# Patient Record
Sex: FEMALE | Race: WHITE | NOT HISPANIC OR LATINO | Employment: FULL TIME | URBAN - NONMETROPOLITAN AREA
[De-identification: names, ages, dates, MRNs, and addresses within clinical notes are randomized per-mention and may not be internally consistent; named-entity substitution may affect disease eponyms.]

---

## 2023-03-14 PROBLEM — K21.9 GERD (GASTROESOPHAGEAL REFLUX DISEASE): Status: ACTIVE | Noted: 2023-03-14

## 2023-03-14 PROBLEM — R19.7 DIARRHEA: Status: ACTIVE | Noted: 2023-03-14

## 2023-03-14 PROBLEM — J45.909 REACTIVE AIRWAY DISEASE (HHS-HCC): Status: ACTIVE | Noted: 2023-03-14

## 2023-03-14 PROBLEM — F32.2 SEVERE MAJOR DEPRESSION (MULTI): Status: ACTIVE | Noted: 2023-03-14

## 2023-03-14 PROBLEM — K63.5 COLON POLYP: Status: ACTIVE | Noted: 2023-03-14

## 2023-03-14 RX ORDER — CHOLECALCIFEROL (VITAMIN D3) 125 MCG
1 CAPSULE ORAL DAILY
COMMUNITY
Start: 2022-10-07 | End: 2024-05-29 | Stop reason: ALTCHOICE

## 2023-03-14 RX ORDER — TRAZODONE HYDROCHLORIDE 50 MG/1
50 TABLET ORAL NIGHTLY
COMMUNITY
End: 2024-05-29 | Stop reason: ALTCHOICE

## 2023-03-14 RX ORDER — CALCIUM CARBONATE 600 MG
600 TABLET ORAL
COMMUNITY
End: 2023-10-04 | Stop reason: SDUPTHER

## 2023-03-14 RX ORDER — LYSINE HCL 500 MG
1 TABLET ORAL 2 TIMES DAILY
COMMUNITY
Start: 2022-10-27 | End: 2024-05-29 | Stop reason: SDUPTHER

## 2023-03-14 RX ORDER — OMEPRAZOLE 40 MG/1
40 CAPSULE, DELAYED RELEASE ORAL
COMMUNITY
Start: 2022-10-07 | End: 2024-05-29 | Stop reason: ALTCHOICE

## 2023-03-14 RX ORDER — ALBUTEROL SULFATE 90 UG/1
2 AEROSOL, METERED RESPIRATORY (INHALATION) EVERY 4 HOURS PRN
COMMUNITY
End: 2024-05-29 | Stop reason: ALTCHOICE

## 2023-03-14 RX ORDER — DICYCLOMINE HYDROCHLORIDE 10 MG/1
10 CAPSULE ORAL 4 TIMES DAILY
COMMUNITY
End: 2024-05-29 | Stop reason: ALTCHOICE

## 2023-03-21 PROBLEM — R56.9 UNSPECIFIED CONVULSIONS (MULTI): Status: ACTIVE | Noted: 2023-03-21

## 2023-03-21 PROBLEM — R63.5 WEIGHT GAIN: Status: ACTIVE | Noted: 2023-03-21

## 2023-03-21 PROBLEM — U07.1 COVID-19: Status: ACTIVE | Noted: 2023-03-21

## 2023-03-21 PROBLEM — Z86.19 PERSONAL HISTORY OF OTHER INFECTIOUS AND PARASITIC DISEASES: Status: ACTIVE | Noted: 2023-03-21

## 2023-03-21 PROBLEM — E55.9 VITAMIN D DEFICIENCY: Status: ACTIVE | Noted: 2023-03-21

## 2023-03-21 PROBLEM — Z87.898 PERSONAL HISTORY OF OTHER SPECIFIED CONDITIONS: Status: ACTIVE | Noted: 2023-03-21

## 2023-03-21 PROBLEM — Z87.09 PERSONAL HISTORY OF OTHER DISEASES OF THE RESPIRATORY SYSTEM: Status: ACTIVE | Noted: 2023-03-21

## 2023-03-21 PROBLEM — Z28.21 IMMUNIZATION NOT CARRIED OUT BECAUSE OF PATIENT REFUSAL: Status: ACTIVE | Noted: 2023-03-21

## 2023-03-22 ENCOUNTER — APPOINTMENT (OUTPATIENT)
Dept: PRIMARY CARE | Facility: CLINIC | Age: 23
End: 2023-03-22
Payer: COMMERCIAL

## 2023-03-27 RX ORDER — MULTIVITAMIN
1 TABLET ORAL 2 TIMES DAILY
COMMUNITY
Start: 2022-10-29 | End: 2023-03-29 | Stop reason: SDUPTHER

## 2023-03-29 ENCOUNTER — OFFICE VISIT (OUTPATIENT)
Dept: PRIMARY CARE | Facility: CLINIC | Age: 23
End: 2023-03-29
Payer: COMMERCIAL

## 2023-03-29 VITALS
HEIGHT: 66 IN | WEIGHT: 214 LBS | DIASTOLIC BLOOD PRESSURE: 76 MMHG | BODY MASS INDEX: 34.39 KG/M2 | SYSTOLIC BLOOD PRESSURE: 118 MMHG

## 2023-03-29 DIAGNOSIS — J35.1 ENLARGED TONSILS: ICD-10-CM

## 2023-03-29 DIAGNOSIS — Z12.4 PAP SMEAR FOR CERVICAL CANCER SCREENING: ICD-10-CM

## 2023-03-29 DIAGNOSIS — R63.2 BINGE EATING: Primary | ICD-10-CM

## 2023-03-29 DIAGNOSIS — E66.09 CLASS 1 OBESITY DUE TO EXCESS CALORIES WITHOUT SERIOUS COMORBIDITY WITH BODY MASS INDEX (BMI) OF 34.0 TO 34.9 IN ADULT: ICD-10-CM

## 2023-03-29 DIAGNOSIS — N63.0 MASS OF BREAST, UNSPECIFIED LATERALITY: ICD-10-CM

## 2023-03-29 PROBLEM — E66.811 CLASS 1 OBESITY DUE TO EXCESS CALORIES WITHOUT SERIOUS COMORBIDITY WITH BODY MASS INDEX (BMI) OF 34.0 TO 34.9 IN ADULT: Status: ACTIVE | Noted: 2023-03-29

## 2023-03-29 PROCEDURE — 99214 OFFICE O/P EST MOD 30 MIN: CPT | Performed by: NURSE PRACTITIONER

## 2023-03-29 PROCEDURE — 3008F BODY MASS INDEX DOCD: CPT | Performed by: NURSE PRACTITIONER

## 2023-03-29 PROCEDURE — 4004F PT TOBACCO SCREEN RCVD TLK: CPT | Performed by: NURSE PRACTITIONER

## 2023-03-29 ASSESSMENT — ENCOUNTER SYMPTOMS
CHOKING: 0
HEADACHES: 0
FACIAL ASYMMETRY: 0
DYSURIA: 0
COUGH: 0
PALPITATIONS: 0
SPEECH DIFFICULTY: 0
NECK PAIN: 0
CONSTIPATION: 0
HEMATURIA: 0
PHOTOPHOBIA: 0
DIFFICULTY URINATING: 0
NERVOUS/ANXIOUS: 0
NUMBNESS: 0
WEAKNESS: 0
VOMITING: 0
FREQUENCY: 0
ABDOMINAL PAIN: 0
MYALGIAS: 0
CHEST TIGHTNESS: 0
EYE PAIN: 0
NAUSEA: 0
WOUND: 0
JOINT SWELLING: 0
WHEEZING: 0
ARTHRALGIAS: 0
ABDOMINAL DISTENTION: 0
SORE THROAT: 0
CHILLS: 0
TROUBLE SWALLOWING: 0
BACK PAIN: 0
FLANK PAIN: 0
SHORTNESS OF BREATH: 0
FEVER: 0
BLOOD IN STOOL: 0
UNEXPECTED WEIGHT CHANGE: 0
SEIZURES: 0
DIZZINESS: 0
APNEA: 0
EYE REDNESS: 0
FATIGUE: 0
SLEEP DISTURBANCE: 0
DIARRHEA: 0
CONFUSION: 0

## 2023-03-29 ASSESSMENT — PATIENT HEALTH QUESTIONNAIRE - PHQ9
SUM OF ALL RESPONSES TO PHQ9 QUESTIONS 1 AND 2: 0
2. FEELING DOWN, DEPRESSED OR HOPELESS: NOT AT ALL
1. LITTLE INTEREST OR PLEASURE IN DOING THINGS: NOT AT ALL

## 2023-03-29 NOTE — PROGRESS NOTES
"Subjective   Patient ID: Jo Ann Harden is a 23 y.o. female who presents for Weight Gain (Pt is concerns with the amount of weight she has gained . She states she's gained 50 pounds in 3-4 years ) and Breast Cancer Screening (Pt states she has a family history and has never had a pap or breast exam).      HPI:  Presents today for C/O B/L BREAST LUMPS THAT ARE PAINFUL X 2 YEARS modifying factors consists of BOTH GRANDMOTHERS AND 3 PATERNAL AUNTS HAVE BEEN DX WITH BREAST CANCER  associated symptoms consist of NO NIPPLE DISCHARGE  prior treatment consists of medication NONE    BMI- STATES SHE WILL BINGE EAT AND UNABLE TO CONTROL IT. REFUSING MED MANAGEMENT AT SI TIME. WILL REFER TO [facility] FOR COUNSELING   Visit Vitals  /76 (BP Location: Right arm, Patient Position: Sitting)   Ht 1.676 m (5' 6\")   Wt 97.1 kg (214 lb)   BMI 34.54 kg/m²   OB Status Having periods   Smoking Status Every Day   BSA 2.13 m²        Review of Systems   Constitutional:  Negative for chills, fatigue, fever and unexpected weight change.   HENT:  Negative for congestion, ear pain, sore throat and trouble swallowing.    Eyes:  Negative for photophobia, pain, redness and visual disturbance.   Respiratory:  Negative for apnea, cough, choking, chest tightness, shortness of breath and wheezing.    Cardiovascular:  Negative for chest pain, palpitations and leg swelling.   Gastrointestinal:  Negative for abdominal distention, abdominal pain, blood in stool, constipation, diarrhea, nausea and vomiting.   Genitourinary:  Negative for difficulty urinating, dysuria, flank pain, frequency, hematuria and urgency.   Musculoskeletal:  Negative for arthralgias, back pain, gait problem, joint swelling, myalgias and neck pain.   Skin:  Negative for rash and wound.   Neurological:  Negative for dizziness, seizures, syncope, facial asymmetry, speech difficulty, weakness, numbness and headaches.   Psychiatric/Behavioral:  Negative for confusion, sleep " disturbance and suicidal ideas. The patient is not nervous/anxious.        Objective     Physical Exam  Constitutional:       Appearance: Normal appearance. She is normal weight.   HENT:      Head: Normocephalic.   Eyes:      Extraocular Movements: Extraocular movements intact.      Conjunctiva/sclera: Conjunctivae normal.      Pupils: Pupils are equal, round, and reactive to light.   Cardiovascular:      Rate and Rhythm: Normal rate and regular rhythm.      Pulses: Normal pulses.      Heart sounds: Normal heart sounds.   Pulmonary:      Effort: Pulmonary effort is normal.      Breath sounds: Normal breath sounds.   Musculoskeletal:         General: Normal range of motion.      Cervical back: Normal range of motion.   Skin:     General: Skin is warm and dry.   Neurological:      General: No focal deficit present.      Mental Status: She is alert and oriented to person, place, and time.   Psychiatric:         Mood and Affect: Mood normal.         Behavior: Behavior normal.         Thought Content: Thought content normal.         Judgment: Judgment normal.            Assessment/Plan   Problem List Items Addressed This Visit          Digestive    Enlarged tonsils    Relevant Orders    Referral to ENT       Endocrine/Metabolic    Class 1 obesity due to excess calories without serious comorbidity with body mass index (BMI) of 34.0 to 34.9 in adult       Other    Binge eating - Primary    Relevant Orders    Referral to Psychology    Mass of breast    Relevant Orders    BI mammo bilateral diagnostic tomosynthesis       WE DISCUSSED MOST COMMON SIDE EFFECTS OF PRESCRIBED MEDICATIONS. INDICATIONS, RISK, COMPLICATIONS, AND ALTERNATIVES OF MEDICATION/THERAPEUTICS WERE EXPLAINED AND DISCUSSED. PLEASE MONITOR CLOSELY FOR ANY UNTOWARD SIDE EFFECTS OR COMPLICATIONS OF MEDICATIONS. PATIENT IS STRONGLY ADVISED TO BE COMPLIANT WITH RECOMMENDATIONS. QUESTIONS AND CONCERNS WERE ADDRESSED. INSTRUCTED TO CALL, RETURN SOONER, OR GO TO THE  ER,  IF SYMPTOMS PERSIST OR WORSEN. THEY VOICED UNDERSTANDING AND  DENIES FURTHER QUESTIONS AT THIS TIME.    TIME CODE  1. PREPARATION FOR PATIENT'S VISIT (REVIEWING CHART, CURRENT MEDICAL RECORDS, OUTSIDE HEALTH PROVIDER RECORDS, PREVIOUS HISTORY, EXAM, TEST, PROCEDURE, AND MEDICATIONS)  2. FACE TO FACE ENCOUNTER OBTAINING HISTORY FROM THE PATIENT/FAMILY/CAREGIVERS; PERFORMING EVALUATION AND EXAMINATION; ORDERING TESTS OR PROCEDURES; REFERRING AND COMMUNICATING WITH OTHER HEALTHCARE PROVIDERS; COUNSELING AND EDUCATION OF THE PATIENT/FAMILY/CAREGIVERS; INDEPENDENTLY INTERPRETING RESULTS (TESTS, LABS, PROCEDURES, IMAGING) AND COMMUNICATING AND EXPLAINING RESULTS TO THE PATIENT/FAMILY/CAREGIVERS  3. COORDINATION OF CARE; PREPARING AND PRINTING DISCHARGE INSTRUCTIONS AND ANY EDUCATIONAL MATERIAL FOR THE PATIENT/FAMILY/CAREGIVERS. DOCUMENTING CLINICAL INFORMATION IN THE ELECTRONIC MEDICAL RECORD   4. REVIEWING OARRS AS NEEDED    MDM  1) COMPLEXITY: MORE THAN 1 STABLE CHRONIC CONDITION ADDRESSED OR 1 ACUTE ILLNESS ADDRESSED   2)DATA: TESTS INTERPRETED AND OR ORDERED, TOOK INDEPENDENT HISTORY OR RECORDS REVIEWED  3)RISK: MODERATE RISK DUE TO NATURE OF MEDICAL CONDITIONS/COMORBIDITY OR MEDICATIONS ORDERED OR SURGICAL OR PROCEDURE REFERRAL    Follow up as before

## 2023-03-31 ENCOUNTER — TELEPHONE (OUTPATIENT)
Dept: PRIMARY CARE | Facility: CLINIC | Age: 23
End: 2023-03-31
Payer: COMMERCIAL

## 2023-03-31 NOTE — TELEPHONE ENCOUNTER
Annika  from mammo department called and states pt will need an order for bilateral limited US due to her age instead of mammogram

## 2023-10-04 ENCOUNTER — OFFICE VISIT (OUTPATIENT)
Dept: PRIMARY CARE | Facility: CLINIC | Age: 23
End: 2023-10-04
Payer: COMMERCIAL

## 2023-10-04 VITALS
WEIGHT: 220 LBS | DIASTOLIC BLOOD PRESSURE: 78 MMHG | HEART RATE: 72 BPM | SYSTOLIC BLOOD PRESSURE: 122 MMHG | BODY MASS INDEX: 34.53 KG/M2 | HEIGHT: 67 IN

## 2023-10-04 DIAGNOSIS — E55.9 VITAMIN D DEFICIENCY: ICD-10-CM

## 2023-10-04 DIAGNOSIS — L65.9 HAIR LOSS: ICD-10-CM

## 2023-10-04 DIAGNOSIS — M25.50 ARTHRALGIA, UNSPECIFIED JOINT: ICD-10-CM

## 2023-10-04 DIAGNOSIS — K04.7 TOOTH INFECTION: ICD-10-CM

## 2023-10-04 DIAGNOSIS — N94.6 PAINFUL MENSTRUATION: ICD-10-CM

## 2023-10-04 DIAGNOSIS — R53.83 OTHER FATIGUE: ICD-10-CM

## 2023-10-04 DIAGNOSIS — E66.09 CLASS 1 OBESITY DUE TO EXCESS CALORIES WITHOUT SERIOUS COMORBIDITY WITH BODY MASS INDEX (BMI) OF 34.0 TO 34.9 IN ADULT: Primary | ICD-10-CM

## 2023-10-04 DIAGNOSIS — R63.5 WEIGHT GAIN: ICD-10-CM

## 2023-10-04 PROCEDURE — 99214 OFFICE O/P EST MOD 30 MIN: CPT | Performed by: NURSE PRACTITIONER

## 2023-10-04 PROCEDURE — 3008F BODY MASS INDEX DOCD: CPT | Performed by: NURSE PRACTITIONER

## 2023-10-04 PROCEDURE — 4004F PT TOBACCO SCREEN RCVD TLK: CPT | Performed by: NURSE PRACTITIONER

## 2023-10-04 RX ORDER — CLINDAMYCIN HYDROCHLORIDE 300 MG/1
300 CAPSULE ORAL 4 TIMES DAILY
Qty: 40 CAPSULE | Refills: 0 | Status: SHIPPED | OUTPATIENT
Start: 2023-10-04 | End: 2023-10-14

## 2023-10-04 ASSESSMENT — PATIENT HEALTH QUESTIONNAIRE - PHQ9
2. FEELING DOWN, DEPRESSED OR HOPELESS: NOT AT ALL
1. LITTLE INTEREST OR PLEASURE IN DOING THINGS: NOT AT ALL
SUM OF ALL RESPONSES TO PHQ9 QUESTIONS 1 AND 2: 0

## 2023-10-04 ASSESSMENT — ENCOUNTER SYMPTOMS
FLANK PAIN: 0
SORE THROAT: 0
FATIGUE: 0
NERVOUS/ANXIOUS: 0
ABDOMINAL PAIN: 0
HEADACHES: 0
NUMBNESS: 0
UNEXPECTED WEIGHT CHANGE: 0
FEVER: 0
DIZZINESS: 0
EYE REDNESS: 0
PHOTOPHOBIA: 0
EYE PAIN: 0
DYSURIA: 0
CHOKING: 0
FREQUENCY: 0
HEMATURIA: 0
SPEECH DIFFICULTY: 0
MYALGIAS: 0
PALPITATIONS: 0
CONFUSION: 0
NECK PAIN: 0
DIFFICULTY URINATING: 0
ARTHRALGIAS: 1
WOUND: 0
JOINT SWELLING: 0
NAUSEA: 0
COUGH: 0
VOMITING: 0
WEAKNESS: 0
ABDOMINAL DISTENTION: 0
FACIAL ASYMMETRY: 0
CHILLS: 0
SLEEP DISTURBANCE: 0
SEIZURES: 0
BACK PAIN: 0
APNEA: 0
DIARRHEA: 0
CHEST TIGHTNESS: 0
SHORTNESS OF BREATH: 0
CONSTIPATION: 0
BLOOD IN STOOL: 0
WHEEZING: 0
TROUBLE SWALLOWING: 0

## 2023-10-04 NOTE — PROGRESS NOTES
"Subjective   Patient ID: Jo Ann Harden is a 23 y.o. female who presents for Follow-up (B/l ankles feet and hands ).        HPI:  Presents today for [facility] ER ON 10/3/23 FOR B/L ANKLE SWELLING. C/O JOINT PAIN ALL OVER X SEVERAL YEARS  modifying factors consists of NONE associated symptoms consist of SWELLING TO HANDS AND FEET ON/OFF. WEIGHT GAIN. BRITTLE NAILS. FATIGUE. HAIR LOSS.  prior treatment consists of medication NONE      C/O TOOTH PAIN X SEVERAL WEEKS    modifying factors consists of DOES NOT HAVE A DENTIST APPT    associated symptoms consist of   GUM SWELLING    prior treatment consists of medication NONE    Visit Vitals  /78 (BP Location: Right arm, Patient Position: Sitting)   Pulse 72   Ht 1.702 m (5' 7\")   Wt 99.8 kg (220 lb)   LMP  (LMP Unknown)   BMI 34.46 kg/m²   OB Status Having periods   Smoking Status Every Day   BSA 2.17 m²        Review of Systems   Constitutional:  Negative for chills, fatigue, fever and unexpected weight change.   HENT:  Negative for congestion, ear pain, sore throat and trouble swallowing.    Eyes:  Negative for photophobia, pain, redness and visual disturbance.   Respiratory:  Negative for apnea, cough, choking, chest tightness, shortness of breath and wheezing.    Cardiovascular:  Negative for chest pain, palpitations and leg swelling.   Gastrointestinal:  Negative for abdominal distention, abdominal pain, blood in stool, constipation, diarrhea, nausea and vomiting.   Genitourinary:  Negative for difficulty urinating, dysuria, flank pain, frequency, hematuria and urgency.   Musculoskeletal:  Positive for arthralgias. Negative for back pain, gait problem, joint swelling, myalgias and neck pain.   Skin:  Negative for rash and wound.   Neurological:  Negative for dizziness, seizures, syncope, facial asymmetry, speech difficulty, weakness, numbness and headaches.   Psychiatric/Behavioral:  Negative for confusion, sleep disturbance and suicidal ideas. The patient is " not nervous/anxious.        Objective     Physical Exam  Constitutional:       Appearance: Normal appearance. She is normal weight.   HENT:      Head: Normocephalic.   Eyes:      Extraocular Movements: Extraocular movements intact.      Conjunctiva/sclera: Conjunctivae normal.      Pupils: Pupils are equal, round, and reactive to light.   Cardiovascular:      Rate and Rhythm: Normal rate and regular rhythm.      Pulses: Normal pulses.      Heart sounds: Normal heart sounds.   Pulmonary:      Effort: Pulmonary effort is normal.      Breath sounds: Normal breath sounds.   Musculoskeletal:         General: Normal range of motion.      Cervical back: Normal range of motion.      Comments: B/L HAND SWELLING    GENERALIZED JOINT PAIN    Skin:     General: Skin is warm and dry.   Neurological:      General: No focal deficit present.      Mental Status: She is alert and oriented to person, place, and time.   Psychiatric:         Mood and Affect: Mood normal.         Behavior: Behavior normal.         Thought Content: Thought content normal.         Judgment: Judgment normal.            Assessment/Plan   Problem List Items Addressed This Visit       Vitamin D deficiency    Relevant Orders    Anti-Mullerian Hormone (AMH)    Androstenedione    CBC and Auto Differential    Cortisol    DHEA    Estrogens, Total    Follicle Stimulating Hormone    Growth Hormone    Uric Acid    Vitamin D 25-Hydroxy,Total (for eval of Vitamin D levels)    Vitamin B12    Ferritin    Folate    Parathyroid Hormone, Intact    Progesterone    Iron and TIBC    Luteinizing Hormone    Magnesium    Prolactin    Testosterone,Free and Total    Hemoglobin A1C    Comprehensive Metabolic Panel    Lipid Panel    TSH with reflex to Free T4 if abnormal    AVTAR + ALEXEI Panel    Citrulline Antibody, IgG    C-Reactive Protein    Rheumatoid Factor    Sedimentation Rate    B-Type Natriuretic Peptide    Weight gain    Relevant Orders    Anti-Mullerian Hormone (AMH)     Androstenedione    CBC and Auto Differential    Cortisol    DHEA    Estrogens, Total    Follicle Stimulating Hormone    Growth Hormone    Uric Acid    Vitamin D 25-Hydroxy,Total (for eval of Vitamin D levels)    Vitamin B12    Ferritin    Folate    Parathyroid Hormone, Intact    Progesterone    Iron and TIBC    Luteinizing Hormone    Magnesium    Prolactin    Testosterone,Free and Total    Hemoglobin A1C    Comprehensive Metabolic Panel    Lipid Panel    TSH with reflex to Free T4 if abnormal    AVTAR + ALEXEI Panel    Citrulline Antibody, IgG    C-Reactive Protein    Rheumatoid Factor    Sedimentation Rate    B-Type Natriuretic Peptide    Class 1 obesity due to excess calories without serious comorbidity with body mass index (BMI) of 34.0 to 34.9 in adult - Primary    Relevant Orders    Anti-Mullerian Hormone (AMH)    Androstenedione    CBC and Auto Differential    Cortisol    DHEA    Estrogens, Total    Follicle Stimulating Hormone    Growth Hormone    Uric Acid    Vitamin D 25-Hydroxy,Total (for eval of Vitamin D levels)    Vitamin B12    Ferritin    Folate    Parathyroid Hormone, Intact    Progesterone    Iron and TIBC    Luteinizing Hormone    Magnesium    Prolactin    Testosterone,Free and Total    Hemoglobin A1C    Comprehensive Metabolic Panel    Lipid Panel    TSH with reflex to Free T4 if abnormal    AVTAR + ALEXEI Panel    Citrulline Antibody, IgG    C-Reactive Protein    Rheumatoid Factor    Sedimentation Rate    B-Type Natriuretic Peptide    Painful menstruation    Relevant Orders    Anti-Mullerian Hormone (AMH)    Androstenedione    CBC and Auto Differential    Cortisol    DHEA    Estrogens, Total    Follicle Stimulating Hormone    Growth Hormone    Uric Acid    Vitamin D 25-Hydroxy,Total (for eval of Vitamin D levels)    Vitamin B12    Ferritin    Folate    Parathyroid Hormone, Intact    Progesterone    Iron and TIBC    Luteinizing Hormone    Magnesium    Prolactin    Testosterone,Free and Total    Hemoglobin  A1C    Comprehensive Metabolic Panel    Lipid Panel    TSH with reflex to Free T4 if abnormal    AVTAR + ALEXEI Panel    Citrulline Antibody, IgG    C-Reactive Protein    Rheumatoid Factor    Sedimentation Rate    B-Type Natriuretic Peptide    Hair loss    Relevant Orders    Anti-Mullerian Hormone (AMH)    Androstenedione    CBC and Auto Differential    Cortisol    DHEA    Estrogens, Total    Follicle Stimulating Hormone    Growth Hormone    Uric Acid    Vitamin D 25-Hydroxy,Total (for eval of Vitamin D levels)    Vitamin B12    Ferritin    Folate    Parathyroid Hormone, Intact    Progesterone    Iron and TIBC    Luteinizing Hormone    Magnesium    Prolactin    Testosterone,Free and Total    Hemoglobin A1C    Comprehensive Metabolic Panel    Lipid Panel    TSH with reflex to Free T4 if abnormal    AVTAR + ALEXEI Panel    Citrulline Antibody, IgG    C-Reactive Protein    Rheumatoid Factor    Sedimentation Rate    B-Type Natriuretic Peptide    Arthralgia    Relevant Orders    Anti-Mullerian Hormone (AMH)    Androstenedione    CBC and Auto Differential    Cortisol    DHEA    Estrogens, Total    Follicle Stimulating Hormone    Growth Hormone    Uric Acid    Vitamin D 25-Hydroxy,Total (for eval of Vitamin D levels)    Vitamin B12    Ferritin    Folate    Parathyroid Hormone, Intact    Progesterone    Iron and TIBC    Luteinizing Hormone    Magnesium    Prolactin    Testosterone,Free and Total    Hemoglobin A1C    Comprehensive Metabolic Panel    Lipid Panel    TSH with reflex to Free T4 if abnormal    AVTAR + ALEXEI Panel    Citrulline Antibody, IgG    C-Reactive Protein    Rheumatoid Factor    Sedimentation Rate    B-Type Natriuretic Peptide    Other fatigue    Relevant Orders    Anti-Mullerian Hormone (AMH)    Androstenedione    CBC and Auto Differential    Cortisol    DHEA    Estrogens, Total    Follicle Stimulating Hormone    Growth Hormone    Uric Acid    Vitamin D 25-Hydroxy,Total (for eval of Vitamin D levels)    Vitamin B12     Ferritin    Folate    Parathyroid Hormone, Intact    Progesterone    Iron and TIBC    Luteinizing Hormone    Magnesium    Prolactin    Testosterone,Free and Total    Hemoglobin A1C    Comprehensive Metabolic Panel    Lipid Panel    TSH with reflex to Free T4 if abnormal    AVTAR + ALEXEI Panel    Citrulline Antibody, IgG    C-Reactive Protein    Rheumatoid Factor    Sedimentation Rate    B-Type Natriuretic Peptide     Other Visit Diagnoses       Tooth infection        Relevant Medications    clindamycin (Cleocin) 300 mg capsule            INSTRUCTED TO CONTACT DENTIST.       AVOID SALT    WE DISCUSSED MOST COMMON SIDE EFFECTS OF PRESCRIBED MEDICATIONS. INDICATIONS, RISK, COMPLICATIONS, AND ALTERNATIVES OF MEDICATION/THERAPEUTICS WERE EXPLAINED AND DISCUSSED. PLEASE MONITOR CLOSELY FOR ANY UNTOWARD SIDE EFFECTS OR COMPLICATIONS OF MEDICATIONS. PATIENT IS STRONGLY ADVISED TO BE COMPLIANT WITH RECOMMENDATIONS. QUESTIONS AND CONCERNS WERE ADDRESSED. INSTRUCTED TO CALL, RETURN SOONER, OR GO TO THE ER,  IF SYMPTOMS PERSIST OR WORSEN. THEY VOICED UNDERSTANDING AND  DENIES FURTHER QUESTIONS AT THIS TIME.    TIME CODE  1. PREPARATION FOR PATIENT'S VISIT (REVIEWING CHART, CURRENT MEDICAL RECORDS, OUTSIDE HEALTH PROVIDER RECORDS, PREVIOUS HISTORY, EXAM, TEST, PROCEDURE, AND MEDICATIONS)  2. FACE TO FACE ENCOUNTER OBTAINING HISTORY FROM THE PATIENT/FAMILY/CAREGIVERS; PERFORMING EVALUATION AND EXAMINATION; ORDERING TESTS OR PROCEDURES; REFERRING AND COMMUNICATING WITH OTHER HEALTHCARE PROVIDERS; COUNSELING AND EDUCATION OF THE PATIENT/FAMILY/CAREGIVERS; INDEPENDENTLY INTERPRETING RESULTS (TESTS, LABS, PROCEDURES, IMAGING) AND COMMUNICATING AND EXPLAINING RESULTS TO THE PATIENT/FAMILY/CAREGIVERS  3. COORDINATION OF CARE; PREPARING AND PRINTING DISCHARGE INSTRUCTIONS AND ANY EDUCATIONAL MATERIAL FOR THE PATIENT/FAMILY/CAREGIVERS. DOCUMENTING CLINICAL INFORMATION IN THE ELECTRONIC MEDICAL RECORD   4. REVIEWING OARRS AS NEEDED    MDM  1)  COMPLEXITY: MORE THAN 1 STABLE CHRONIC CONDITION ADDRESSED OR 1 ACUTE ILLNESS ADDRESSED   2)DATA: TESTS INTERPRETED AND OR ORDERED, TOOK INDEPENDENT HISTORY OR RECORDS REVIEWED  3)RISK: MODERATE RISK DUE TO NATURE OF MEDICAL CONDITIONS/COMORBIDITY OR MEDICATIONS ORDERED OR SURGICAL OR PROCEDURE REFERRAL    2-3 WEEKS WITH LABS

## 2023-10-04 NOTE — LETTER
October 4, 2023     Patient: Jo Ann Harden   YOB: 2000   Date of Visit: 10/4/2023       To Whom It May Concern:    Jo Ann Harden was seen in my clinic on 10/4/2023. Please excuse Jo Ann for her absence from work on this day.  If you have any questions or concerns, please don't hesitate to call.         Sincerely,         Brooke Chacko, MEERA-CNP        CC: No Recipients

## 2023-10-11 ENCOUNTER — TELEPHONE (OUTPATIENT)
Dept: PRIMARY CARE | Facility: CLINIC | Age: 23
End: 2023-10-11
Payer: COMMERCIAL

## 2023-10-11 NOTE — TELEPHONE ENCOUNTER
Pt state when she takes clindamycin she has diarrhea and very bad acid reflux. Pt has stopped taking medication .Please advise

## 2023-10-18 ENCOUNTER — APPOINTMENT (OUTPATIENT)
Dept: PRIMARY CARE | Facility: CLINIC | Age: 23
End: 2023-10-18
Payer: COMMERCIAL

## 2024-05-29 ENCOUNTER — OFFICE VISIT (OUTPATIENT)
Dept: PRIMARY CARE | Facility: CLINIC | Age: 24
End: 2024-05-29
Payer: COMMERCIAL

## 2024-05-29 VITALS
SYSTOLIC BLOOD PRESSURE: 138 MMHG | HEIGHT: 66 IN | DIASTOLIC BLOOD PRESSURE: 88 MMHG | HEART RATE: 86 BPM | WEIGHT: 227.8 LBS | OXYGEN SATURATION: 96 % | BODY MASS INDEX: 36.61 KG/M2

## 2024-05-29 DIAGNOSIS — F32.2 SEVERE MAJOR DEPRESSION (MULTI): ICD-10-CM

## 2024-05-29 DIAGNOSIS — E55.9 VITAMIN D DEFICIENCY: ICD-10-CM

## 2024-05-29 DIAGNOSIS — J45.20 MILD INTERMITTENT REACTIVE AIRWAY DISEASE WITHOUT COMPLICATION (HHS-HCC): Primary | ICD-10-CM

## 2024-05-29 PROBLEM — M25.50 ARTHRALGIA: Status: RESOLVED | Noted: 2023-10-04 | Resolved: 2024-05-29

## 2024-05-29 PROBLEM — R19.7 DIARRHEA: Status: RESOLVED | Noted: 2023-03-14 | Resolved: 2024-05-29

## 2024-05-29 PROBLEM — Z87.09 PERSONAL HISTORY OF OTHER DISEASES OF THE RESPIRATORY SYSTEM: Status: RESOLVED | Noted: 2023-03-21 | Resolved: 2024-05-29

## 2024-05-29 PROBLEM — R56.9 UNSPECIFIED CONVULSIONS (MULTI): Status: RESOLVED | Noted: 2023-03-21 | Resolved: 2024-05-29

## 2024-05-29 PROBLEM — R53.83 OTHER FATIGUE: Status: RESOLVED | Noted: 2023-10-04 | Resolved: 2024-05-29

## 2024-05-29 PROBLEM — N94.6 PAINFUL MENSTRUATION: Status: RESOLVED | Noted: 2023-10-04 | Resolved: 2024-05-29

## 2024-05-29 PROBLEM — U07.1 COVID-19: Status: RESOLVED | Noted: 2023-03-21 | Resolved: 2024-05-29

## 2024-05-29 PROBLEM — Z28.21 IMMUNIZATION NOT CARRIED OUT BECAUSE OF PATIENT REFUSAL: Status: RESOLVED | Noted: 2023-03-21 | Resolved: 2024-05-29

## 2024-05-29 PROBLEM — R63.5 WEIGHT GAIN: Status: RESOLVED | Noted: 2023-03-21 | Resolved: 2024-05-29

## 2024-05-29 PROBLEM — N63.0 MASS OF BREAST: Status: RESOLVED | Noted: 2023-03-29 | Resolved: 2024-05-29

## 2024-05-29 PROBLEM — Z86.19 PERSONAL HISTORY OF OTHER INFECTIOUS AND PARASITIC DISEASES: Status: RESOLVED | Noted: 2023-03-21 | Resolved: 2024-05-29

## 2024-05-29 PROCEDURE — 4004F PT TOBACCO SCREEN RCVD TLK: CPT | Performed by: NURSE PRACTITIONER

## 2024-05-29 PROCEDURE — 99213 OFFICE O/P EST LOW 20 MIN: CPT | Performed by: NURSE PRACTITIONER

## 2024-05-29 PROCEDURE — 3008F BODY MASS INDEX DOCD: CPT | Performed by: NURSE PRACTITIONER

## 2024-05-29 RX ORDER — LYSINE HCL 500 MG
1 TABLET ORAL 2 TIMES DAILY
Qty: 180 EACH | Refills: 3 | Status: SHIPPED | OUTPATIENT
Start: 2024-05-29

## 2024-05-29 RX ORDER — ALBUTEROL SULFATE 90 UG/1
2 AEROSOL, METERED RESPIRATORY (INHALATION) EVERY 4 HOURS PRN
Qty: 18 G | Refills: 11 | Status: SHIPPED | OUTPATIENT
Start: 2024-05-29 | End: 2025-05-29

## 2024-05-29 ASSESSMENT — ENCOUNTER SYMPTOMS
EYE REDNESS: 0
DIZZINESS: 0
SEIZURES: 0
SHORTNESS OF BREATH: 0
SORE THROAT: 0
PHOTOPHOBIA: 0
NECK PAIN: 0
WOUND: 0
FREQUENCY: 0
DYSURIA: 0
FACIAL ASYMMETRY: 0
CONSTIPATION: 0
CHILLS: 0
BLOOD IN STOOL: 0
PALPITATIONS: 0
CONFUSION: 0
SPEECH DIFFICULTY: 0
WEAKNESS: 0
JOINT SWELLING: 0
NAUSEA: 0
UNEXPECTED WEIGHT CHANGE: 0
ABDOMINAL DISTENTION: 0
DIARRHEA: 0
BACK PAIN: 0
FEVER: 0
EYE PAIN: 0
ABDOMINAL PAIN: 0
DIFFICULTY URINATING: 0
COUGH: 0
MYALGIAS: 0
FLANK PAIN: 0
ARTHRALGIAS: 0
HEADACHES: 0
SLEEP DISTURBANCE: 0
CHOKING: 0
WHEEZING: 0
NERVOUS/ANXIOUS: 0
FATIGUE: 0
CHEST TIGHTNESS: 0
VOMITING: 0
NUMBNESS: 0
HEMATURIA: 0
TROUBLE SWALLOWING: 0
APNEA: 0

## 2024-05-29 NOTE — PROGRESS NOTES
"Subjective   Patient ID: Jo Ann Harden is a 24 y.o. female who presents for Follow-up (Following up on family hx; trying to get pregnant. ).      HPI:  Presents today for TO UPDATE ON MOTHER'S RECENT COLON CANCER DX. SHE WAS DX THIS WEEK AT AGE 56. JO ANN'S LAST COLON WAS 11/2022, TO BE REPEATED IN 3 YEARS.   NO NEW CONCERNS     BREAST MASS- RESOLVED AFTER HER MENSURATION CYCLE WAS OVER   ASTHMA- STABLE. MED REFILL  DEPRESSION- STABLE  JOINT PAIN- RESOLVED      Visit Vitals  /88 (BP Location: Right arm, Patient Position: Sitting)   Pulse 86   Ht 1.676 m (5' 6\")   Wt 103 kg (227 lb 12.8 oz)   SpO2 96%   BMI 36.77 kg/m²   OB Status Having periods   Smoking Status Every Day   BSA 2.19 m²        Review of Systems   Constitutional:  Negative for chills, fatigue, fever and unexpected weight change.   HENT:  Negative for congestion, ear pain, sore throat and trouble swallowing.    Eyes:  Negative for photophobia, pain, redness and visual disturbance.   Respiratory:  Negative for apnea, cough, choking, chest tightness, shortness of breath and wheezing.    Cardiovascular:  Negative for chest pain, palpitations and leg swelling.   Gastrointestinal:  Negative for abdominal distention, abdominal pain, blood in stool, constipation, diarrhea, nausea and vomiting.   Genitourinary:  Negative for difficulty urinating, dysuria, flank pain, frequency, hematuria and urgency.   Musculoskeletal:  Negative for arthralgias, back pain, gait problem, joint swelling, myalgias and neck pain.   Skin:  Negative for rash and wound.   Neurological:  Negative for dizziness, seizures, syncope, facial asymmetry, speech difficulty, weakness, numbness and headaches.   Psychiatric/Behavioral:  Negative for confusion, sleep disturbance and suicidal ideas. The patient is not nervous/anxious.        Objective     Physical Exam  Constitutional:       Appearance: Normal appearance. She is normal weight.   HENT:      Head: Normocephalic.   Eyes:      " Extraocular Movements: Extraocular movements intact.      Conjunctiva/sclera: Conjunctivae normal.      Pupils: Pupils are equal, round, and reactive to light.   Cardiovascular:      Rate and Rhythm: Normal rate and regular rhythm.      Pulses: Normal pulses.      Heart sounds: Normal heart sounds.   Pulmonary:      Effort: Pulmonary effort is normal.      Breath sounds: Normal breath sounds.   Musculoskeletal:         General: Normal range of motion.      Cervical back: Normal range of motion.   Skin:     General: Skin is warm and dry.   Neurological:      General: No focal deficit present.      Mental Status: She is alert and oriented to person, place, and time.   Psychiatric:         Mood and Affect: Mood normal.         Behavior: Behavior normal.         Thought Content: Thought content normal.         Judgment: Judgment normal.            Assessment/Plan   Problem List Items Addressed This Visit       Reactive airway disease (HHS-HCC) - Primary    Relevant Medications    albuterol (Proventil HFA) 90 mcg/actuation inhaler    Severe major depression (Multi)    Vitamin D deficiency    Relevant Medications    calcium carbonate-vit D3-min 600 mg calcium- 400 unit tablet     INSTRUCTED TO START PRENATAL VITAMIN AND SCHEDULE APPT WITH OBGYN FOR ANNUAL.    PLEASE MONITOR CLOSELY FOR ANY UNTOWARD SIDE EFFECTS OR COMPLICATIONS OF MEDICATIONS. PATIENT IS STRONGLY ADVISED TO BE COMPLIANT WITH RECOMMENDATIONS. QUESTIONS AND CONCERNS WERE ADDRESSED. INSTRUCTED TO CALL, RETURN SOONER, OR GO TO THE ER,  IF SYMPTOMS PERSIST OR WORSEN. THEY VOICED UNDERSTANDING AND  DENIES FURTHER QUESTIONS AT THIS TIME.    TIME CODE  1. PREPARATION FOR PATIENT'S VISIT (REVIEWING CHART, CURRENT MEDICAL RECORDS, OUTSIDE HEALTH PROVIDER RECORDS, PREVIOUS HISTORY, EXAM, TEST, PROCEDURE, AND MEDICATIONS)  2. FACE TO FACE ENCOUNTER OBTAINING HISTORY FROM THE PATIENT/FAMILY/CAREGIVERS; PERFORMING EVALUATION AND EXAMINATION; ORDERING TESTS OR  PROCEDURES; REFERRING AND COMMUNICATING WITH OTHER HEALTHCARE PROVIDERS; COUNSELING AND EDUCATION OF THE PATIENT/FAMILY/CAREGIVERS; INDEPENDENTLY INTERPRETING RESULTS (TESTS, LABS, PROCEDURES, IMAGING) AND COMMUNICATING AND EXPLAINING RESULTS TO THE PATIENT/FAMILY/CAREGIVERS  3. COORDINATION OF CARE; PREPARING AND PRINTING DISCHARGE INSTRUCTIONS AND ANY EDUCATIONAL MATERIAL FOR THE PATIENT/FAMILY/CAREGIVERS. DOCUMENTING CLINICAL INFORMATION IN THE ELECTRONIC MEDICAL RECORD   4. REVIEWING OARRS AS NEEDED    MDM  1) COMPLEXITY: MORE THAN 1 STABLE CHRONIC CONDITION ADDRESSED OR 1 ACUTE ILLNESS ADDRESSED   2)DATA: TESTS INTERPRETED AND OR ORDERED, TOOK INDEPENDENT HISTORY OR RECORDS REVIEWED  3)RISK: MODERATE RISK DUE TO NATURE OF MEDICAL CONDITIONS/COMORBIDITY OR MEDICATIONS ORDERED OR SURGICAL OR PROCEDURE REFERRAL    12 MONTHS WELLNESS. REFUSING LABS AT THIS TIME R/T COST

## 2025-05-27 ENCOUNTER — APPOINTMENT (OUTPATIENT)
Dept: PRIMARY CARE | Facility: CLINIC | Age: 25
End: 2025-05-27
Payer: COMMERCIAL

## 2025-06-04 ENCOUNTER — APPOINTMENT (OUTPATIENT)
Dept: PRIMARY CARE | Facility: CLINIC | Age: 25
End: 2025-06-04
Payer: COMMERCIAL

## 2025-06-04 VITALS
DIASTOLIC BLOOD PRESSURE: 88 MMHG | HEIGHT: 66 IN | BODY MASS INDEX: 33.85 KG/M2 | SYSTOLIC BLOOD PRESSURE: 132 MMHG | WEIGHT: 210.6 LBS | HEART RATE: 83 BPM

## 2025-06-04 DIAGNOSIS — Z13.220 LIPID SCREENING: ICD-10-CM

## 2025-06-04 DIAGNOSIS — Z13.29 THYROID DISORDER SCREEN: ICD-10-CM

## 2025-06-04 DIAGNOSIS — R73.01 IFG (IMPAIRED FASTING GLUCOSE): ICD-10-CM

## 2025-06-04 DIAGNOSIS — E55.9 VITAMIN D DEFICIENCY: ICD-10-CM

## 2025-06-04 DIAGNOSIS — F32.2 SEVERE MAJOR DEPRESSION (MULTI): ICD-10-CM

## 2025-06-04 DIAGNOSIS — I10 PRIMARY HYPERTENSION: ICD-10-CM

## 2025-06-04 DIAGNOSIS — Z00.00 WELLNESS EXAMINATION: Primary | ICD-10-CM

## 2025-06-04 DIAGNOSIS — J45.20 MILD INTERMITTENT REACTIVE AIRWAY DISEASE WITHOUT COMPLICATION (HHS-HCC): ICD-10-CM

## 2025-06-04 PROCEDURE — 3008F BODY MASS INDEX DOCD: CPT | Performed by: NURSE PRACTITIONER

## 2025-06-04 PROCEDURE — 3075F SYST BP GE 130 - 139MM HG: CPT | Performed by: NURSE PRACTITIONER

## 2025-06-04 PROCEDURE — 99395 PREV VISIT EST AGE 18-39: CPT | Performed by: NURSE PRACTITIONER

## 2025-06-04 PROCEDURE — 3079F DIAST BP 80-89 MM HG: CPT | Performed by: NURSE PRACTITIONER

## 2025-06-04 RX ORDER — VALACYCLOVIR HYDROCHLORIDE 500 MG/1
1 TABLET, FILM COATED ORAL
COMMUNITY
Start: 2025-03-22

## 2025-06-04 RX ORDER — NIFEDIPINE 30 MG/1
30 TABLET, FILM COATED, EXTENDED RELEASE ORAL
Qty: 90 TABLET | Refills: 3 | Status: SHIPPED | OUTPATIENT
Start: 2025-06-04

## 2025-06-04 ASSESSMENT — ENCOUNTER SYMPTOMS
SPEECH DIFFICULTY: 0
SORE THROAT: 0
EYE REDNESS: 0
PHOTOPHOBIA: 0
ARTHRALGIAS: 0
CHOKING: 0
DIARRHEA: 0
EYE PAIN: 0
CONSTIPATION: 0
DYSURIA: 0
CHILLS: 0
MYALGIAS: 0
CONFUSION: 0
FATIGUE: 0
SHORTNESS OF BREATH: 0
FREQUENCY: 0
PALPITATIONS: 0
WEAKNESS: 0
CHEST TIGHTNESS: 0
FACIAL ASYMMETRY: 0
FEVER: 0
COUGH: 0
BACK PAIN: 0
JOINT SWELLING: 0
ABDOMINAL DISTENTION: 0
HEADACHES: 0
NAUSEA: 0
TROUBLE SWALLOWING: 0
VOMITING: 0
NECK PAIN: 0
BLOOD IN STOOL: 0
HEMATURIA: 0
DIZZINESS: 0
WOUND: 0
SLEEP DISTURBANCE: 0
NUMBNESS: 0
SEIZURES: 0
NERVOUS/ANXIOUS: 0
APNEA: 0
DIFFICULTY URINATING: 0
WHEEZING: 0
FLANK PAIN: 0
UNEXPECTED WEIGHT CHANGE: 0
ABDOMINAL PAIN: 0

## 2025-06-04 ASSESSMENT — PATIENT HEALTH QUESTIONNAIRE - PHQ9
1. LITTLE INTEREST OR PLEASURE IN DOING THINGS: NOT AT ALL
2. FEELING DOWN, DEPRESSED OR HOPELESS: NOT AT ALL
SUM OF ALL RESPONSES TO PHQ9 QUESTIONS 1 AND 2: 0

## 2025-06-04 NOTE — PROGRESS NOTES
"Subjective   Patient ID: Jo Ann Harden is a 25 y.o. female who presents for Annual Exam (WELLNESS).      HPI:  Presents today for WELLNESS/GENERAL CHECK UP.  NO LABS DONE. NO CONCERNS       HTN- STABLE. MED REFILL   ASTHMA- STABLE  DEPRESSION- STABLE    Visit Vitals  /88   Pulse 83   Ht 1.676 m (5' 6\")   Wt 95.5 kg (210 lb 9.6 oz)   BMI 33.99 kg/m²   OB Status Having periods   Smoking Status Former   BSA 2.11 m²        Review of Systems   Constitutional:  Negative for chills, fatigue, fever and unexpected weight change.   HENT:  Negative for congestion, ear pain, sore throat and trouble swallowing.    Eyes:  Negative for photophobia, pain, redness and visual disturbance.   Respiratory:  Negative for apnea, cough, choking, chest tightness, shortness of breath and wheezing.    Cardiovascular:  Negative for chest pain, palpitations and leg swelling.   Gastrointestinal:  Negative for abdominal distention, abdominal pain, blood in stool, constipation, diarrhea, nausea and vomiting.   Genitourinary:  Negative for difficulty urinating, dysuria, flank pain, frequency, hematuria and urgency.   Musculoskeletal:  Negative for arthralgias, back pain, gait problem, joint swelling, myalgias and neck pain.   Skin:  Negative for rash and wound.   Neurological:  Negative for dizziness, seizures, syncope, facial asymmetry, speech difficulty, weakness, numbness and headaches.   Psychiatric/Behavioral:  Negative for confusion, sleep disturbance and suicidal ideas. The patient is not nervous/anxious.        Objective     Physical Exam  Constitutional:       Appearance: Normal appearance. She is normal weight.   HENT:      Head: Normocephalic.   Eyes:      Extraocular Movements: Extraocular movements intact.      Conjunctiva/sclera: Conjunctivae normal.      Pupils: Pupils are equal, round, and reactive to light.   Cardiovascular:      Rate and Rhythm: Normal rate and regular rhythm.      Pulses: Normal pulses.      Heart sounds: " Normal heart sounds.   Pulmonary:      Effort: Pulmonary effort is normal.      Breath sounds: Normal breath sounds.   Musculoskeletal:         General: Normal range of motion.      Cervical back: Normal range of motion.   Skin:     General: Skin is warm and dry.   Neurological:      General: No focal deficit present.      Mental Status: She is alert and oriented to person, place, and time.   Psychiatric:         Mood and Affect: Mood normal.         Behavior: Behavior normal.         Thought Content: Thought content normal.         Judgment: Judgment normal.            Assessment/Plan   Problem List Items Addressed This Visit       Severe major depression (Multi)    Vitamin D deficiency    Relevant Orders    Vitamin D 25-Hydroxy,Total (for eval of Vitamin D levels)    Parathyroid Hormone, Intact    Primary hypertension    Relevant Medications    NIFEdipine ER (Adalat CC) 30 mg 24 hr tablet     Other Visit Diagnoses         Wellness examination    -  Primary      IFG (impaired fasting glucose)        Relevant Orders    CBC and Auto Differential    Comprehensive Metabolic Panel    Hemoglobin A1C      Lipid screening        Relevant Orders    Lipid Panel      Thyroid disorder screen        Relevant Orders    TSH with reflex to Free T4 if abnormal        SHE IS REFUSING LABS AT THIS TIME R/T COSTS. SHE IS IN AGREEMENT TO DO LABS IN 1 YEAR     WE DISCUSSED MOST COMMON SIDE EFFECTS OF PRESCRIBED MEDICATIONS. INDICATIONS, RISK, COMPLICATIONS, AND ALTERNATIVES OF MEDICATION/THERAPEUTICS WERE EXPLAINED AND DISCUSSED. PLEASE MONITOR CLOSELY FOR ANY UNTOWARD SIDE EFFECTS OR COMPLICATIONS OF MEDICATIONS. PATIENT IS STRONGLY ADVISED TO BE COMPLIANT WITH RECOMMENDATIONS. QUESTIONS AND CONCERNS WERE ADDRESSED. INSTRUCTED TO CALL, RETURN SOONER, OR GO TO THE ER,  IF SYMPTOMS PERSIST OR WORSEN. THEY VOICED UNDERSTANDING AND  DENIES FURTHER QUESTIONS AT THIS TIME.    TIME CODE  1. PREPARATION FOR PATIENT'S VISIT (REVIEWING CHART,  CURRENT MEDICAL RECORDS, OUTSIDE HEALTH PROVIDER RECORDS, PREVIOUS HISTORY, EXAM, TEST, PROCEDURE, AND MEDICATIONS)  2. FACE TO FACE ENCOUNTER OBTAINING HISTORY FROM THE PATIENT/FAMILY/CAREGIVERS; PERFORMING EVALUATION AND EXAMINATION; ORDERING TESTS OR PROCEDURES; REFERRING AND COMMUNICATING WITH OTHER HEALTHCARE PROVIDERS; COUNSELING AND EDUCATION OF THE PATIENT/FAMILY/CAREGIVERS; INDEPENDENTLY INTERPRETING RESULTS (TESTS, LABS, PROCEDURES, IMAGING) AND COMMUNICATING AND EXPLAINING RESULTS TO THE PATIENT/FAMILY/CAREGIVERS  3. COORDINATION OF CARE; PREPARING AND PRINTING DISCHARGE INSTRUCTIONS AND ANY EDUCATIONAL MATERIAL FOR THE PATIENT/FAMILY/CAREGIVERS. DOCUMENTING CLINICAL INFORMATION IN THE ELECTRONIC MEDICAL RECORD   4. REVIEWING OARRS AS NEEDED    MDM  1) COMPLEXITY: 1 ACUTE ILLNESS, 1 STABLE CHRONIC CONDITION, OR 2 MINOR PROBLEMS ADDRESSED   2)DATA: TESTS INTERPRETED AND OR ORDERED, TOOK INDEPENDENT HISTORY OR RECORDS REVIEWED  3)RISK: LOW RISK DUE TO NATURE OF MEDICAL CONDITIONS/COMORBIDITY OR MEDICATIONS ORDERED OR SURGICAL OR PROCEDURE REFERRAL    12 MONTHS WELLNESS WITH LABS